# Patient Record
Sex: MALE | Race: WHITE | NOT HISPANIC OR LATINO | Employment: OTHER | URBAN - METROPOLITAN AREA
[De-identification: names, ages, dates, MRNs, and addresses within clinical notes are randomized per-mention and may not be internally consistent; named-entity substitution may affect disease eponyms.]

---

## 2018-01-04 ENCOUNTER — HISTORICAL (OUTPATIENT)
Dept: ADMINISTRATIVE | Facility: HOSPITAL | Age: 68
End: 2018-01-04

## 2021-08-19 ENCOUNTER — OFFICE VISIT (OUTPATIENT)
Dept: UROLOGY | Facility: CLINIC | Age: 71
End: 2021-08-19
Payer: MEDICARE

## 2021-08-19 VITALS
DIASTOLIC BLOOD PRESSURE: 60 MMHG | BODY MASS INDEX: 39.37 KG/M2 | WEIGHT: 245 LBS | RESPIRATION RATE: 18 BRPM | HEIGHT: 66 IN | HEART RATE: 71 BPM | SYSTOLIC BLOOD PRESSURE: 124 MMHG

## 2021-08-19 DIAGNOSIS — N47.1 PHIMOSIS OF PENIS: Primary | ICD-10-CM

## 2021-08-19 DIAGNOSIS — N40.1 BPH WITH OBSTRUCTION/LOWER URINARY TRACT SYMPTOMS: ICD-10-CM

## 2021-08-19 DIAGNOSIS — N13.8 BPH WITH OBSTRUCTION/LOWER URINARY TRACT SYMPTOMS: ICD-10-CM

## 2021-08-19 LAB
ANION GAP SERPL CALC-SCNC: 7 MMOL/L (ref 3–11)
APPEARANCE, UA: CLEAR
BASOPHILS NFR BLD: 0.4 % (ref 0–3)
BILIRUB UR QL STRIP: NEGATIVE MG/DL
BUN SERPL-MCNC: 16 MG/DL (ref 7–18)
BUN/CREAT SERPL: 17.97 RATIO (ref 7–18)
CALCIUM SERPL-MCNC: 9.1 MG/DL (ref 8.8–10.5)
CHLORIDE SERPL-SCNC: 104 MMOL/L (ref 100–108)
CO2 SERPL-SCNC: 29 MMOL/L (ref 21–32)
COLOR UR: ABNORMAL
CREAT SERPL-MCNC: 0.89 MG/DL (ref 0.7–1.3)
EOSINOPHIL NFR BLD: 1 % (ref 1–3)
ERYTHROCYTE [DISTWIDTH] IN BLOOD BY AUTOMATED COUNT: 14.6 % (ref 12.5–18)
GFR ESTIMATION: > 60
GLUCOSE (UA): 1000 MG/DL
GLUCOSE SERPL-MCNC: 135 MG/DL (ref 70–110)
HCT VFR BLD AUTO: 47.1 % (ref 42–52)
HGB BLD-MCNC: 15.7 G/DL (ref 14–18)
HGB UR QL STRIP: NEGATIVE /UL
KETONES UR QL STRIP: NEGATIVE MG/DL
LEUKOCYTE ESTERASE UR QL STRIP: NEGATIVE /UL
LYMPHOCYTES NFR BLD: 28.7 % (ref 25–40)
MCH RBC QN AUTO: 31 PG (ref 27–31.2)
MCHC RBC AUTO-ENTMCNC: 33.3 G/DL (ref 31.8–35.4)
MCV RBC AUTO: 93.1 FL (ref 80–97)
MONOCYTES NFR BLD: 9.4 % (ref 1–15)
NEUTROPHILS # BLD AUTO: 3.98 10*3/UL (ref 1.8–7.7)
NEUTROPHILS NFR BLD: 59.3 % (ref 37–80)
NITRITE UR QL STRIP: NEGATIVE
NUCLEATED RED BLOOD CELLS: 0 %
PH UR STRIP: 5 PH (ref 5–9)
PLATELETS: 190 10*3/UL (ref 142–424)
POTASSIUM SERPL-SCNC: 4.7 MMOL/L (ref 3.6–5.2)
PROT UR QL STRIP: NEGATIVE MG/DL
RBC # BLD AUTO: 5.06 10*6/UL (ref 4.7–6.1)
SODIUM BLD-SCNC: 140 MMOL/L (ref 135–145)
SP GR UR STRIP: 1.01 (ref 1–1.03)
SPECIMEN COLLECTION METHOD, URINE: ABNORMAL
UROBILINOGEN UR STRIP-ACNC: NORMAL MG/DL
WBC # BLD: 6.7 10*3/UL (ref 4.6–10.2)

## 2021-08-19 PROCEDURE — 99203 PR OFFICE/OUTPT VISIT, NEW, LEVL III, 30-44 MIN: ICD-10-PCS | Mod: S$GLB,,, | Performed by: UROLOGY

## 2021-08-19 PROCEDURE — 99203 OFFICE O/P NEW LOW 30 MIN: CPT | Mod: S$GLB,,, | Performed by: UROLOGY

## 2021-08-19 RX ORDER — AMLODIPINE BESYLATE 5 MG/1
5 TABLET ORAL DAILY
COMMUNITY
Start: 2021-07-15

## 2021-08-19 RX ORDER — LISINOPRIL 20 MG/1
20 TABLET ORAL 2 TIMES DAILY
COMMUNITY
Start: 2021-06-01

## 2021-08-19 RX ORDER — TAMSULOSIN HYDROCHLORIDE 0.4 MG/1
1 CAPSULE ORAL DAILY
COMMUNITY
Start: 2021-05-18

## 2021-08-19 RX ORDER — ALLOPURINOL 100 MG/1
100 TABLET ORAL DAILY
COMMUNITY
Start: 2021-06-29

## 2021-08-19 RX ORDER — EMPAGLIFLOZIN 25 MG/1
25 TABLET, FILM COATED ORAL EVERY MORNING
COMMUNITY
Start: 2021-08-09

## 2021-08-19 RX ORDER — MELOXICAM 15 MG/1
15 TABLET ORAL DAILY
COMMUNITY
Start: 2021-07-23

## 2021-08-19 RX ORDER — FINASTERIDE 5 MG/1
5 TABLET, FILM COATED ORAL DAILY
COMMUNITY
Start: 2021-06-22

## 2021-08-19 RX ORDER — SIMVASTATIN 40 MG/1
40 TABLET, FILM COATED ORAL NIGHTLY
COMMUNITY
Start: 2021-07-16

## 2021-08-19 RX ORDER — LATANOPROST 50 UG/ML
SOLUTION/ DROPS OPHTHALMIC
COMMUNITY
Start: 2021-07-20

## 2021-08-23 LAB
B PARAP IS1001 DNA: NOT DETECTED
B PERT.PT PROM REG: NOT DETECTED
C PNEUM DNA: NOT DETECTED
CALCIUM BLD-MCNC: POSITIVE MG/DL
COVID-19 AB, IGM: POSITIVE
FLUAV RNA: NOT DETECTED
FLUBV RNA: NOT DETECTED
HADV DNA: NOT DETECTED
HCOV 229E RNA: NOT DETECTED
HCOV HKU1 RNA: NOT DETECTED
HCOV NL63 RNA: NOT DETECTED
HCOV OC43 RNA: NOT DETECTED
HMPV RNA: NOT DETECTED
HPIV1 RNA: NOT DETECTED
HPIV2 RNA: NOT DETECTED
HPIV3 RNA: NOT DETECTED
HPIV4 RNA: NOT DETECTED
M PNEUMO DNA: NOT DETECTED
RSV RNA: NOT DETECTED
RV+EV RNA: NOT DETECTED
SARS-COV-2 RNA RESP QL NAA+PROBE: NOT DETECTED

## 2021-08-27 ENCOUNTER — OUTSIDE PLACE OF SERVICE (OUTPATIENT)
Dept: UROLOGY | Facility: CLINIC | Age: 71
End: 2021-08-27
Payer: MEDICARE

## 2021-08-27 LAB
GLUCOSE SERPL-MCNC: 163 MG/DL (ref 70–105)
GLUCOSE SERPL-MCNC: 165 MG/DL (ref 70–105)

## 2021-08-27 PROCEDURE — 54161 PR CIRCUMCISION - SURGICAL NO CLAMP/DEVICE, 29+ DAYS OF AGE ONLY: ICD-10-PCS | Mod: ,,, | Performed by: UROLOGY

## 2021-08-27 PROCEDURE — 54161 CIRCUM 28 DAYS OR OLDER: CPT | Mod: ,,, | Performed by: UROLOGY

## 2021-08-29 LAB — SPECIMEN TO PATHOLOGY: NORMAL

## 2021-09-16 ENCOUNTER — OFFICE VISIT (OUTPATIENT)
Dept: UROLOGY | Facility: CLINIC | Age: 71
End: 2021-09-16
Payer: MEDICARE

## 2021-09-16 DIAGNOSIS — N13.8 BPH WITH OBSTRUCTION/LOWER URINARY TRACT SYMPTOMS: ICD-10-CM

## 2021-09-16 DIAGNOSIS — N47.1 PHIMOSIS OF PENIS: Primary | ICD-10-CM

## 2021-09-16 DIAGNOSIS — N40.1 BPH WITH OBSTRUCTION/LOWER URINARY TRACT SYMPTOMS: ICD-10-CM

## 2021-09-16 PROCEDURE — 51798 PR MEAS,POST-VOID RES,US,NON-IMAGING: ICD-10-PCS | Mod: S$GLB,,, | Performed by: NURSE PRACTITIONER

## 2021-09-16 PROCEDURE — 51798 US URINE CAPACITY MEASURE: CPT | Mod: S$GLB,,, | Performed by: NURSE PRACTITIONER

## 2021-09-16 PROCEDURE — 99024 PR POST-OP FOLLOW-UP VISIT: ICD-10-PCS | Mod: S$GLB,POP,, | Performed by: UROLOGY

## 2021-09-16 PROCEDURE — 99024 POSTOP FOLLOW-UP VISIT: CPT | Mod: S$GLB,POP,, | Performed by: UROLOGY

## 2022-04-30 NOTE — OP NOTE
DATE OF SURGERY:        SURGEON:  Sourav Galvez MD    PREOPERATIVE DIAGNOSIS:  Non-occurring vitreous hemorrhage of the left eye.    POSTOPERATIVE DIAGNOSIS:  Non-occurring vitreous hemorrhage of the left eye.    PROCEDURE:  Pars plana vitrectomy with examination under anesthesia of the left eye.    ANESTHESIA:  General.    ESTIMATED BLOOD LOSS:  Less than 5 cc.    COMPLICATIONS:  None.    PROCEDURE INDICATIONS:  Mr. Wallace has a previous history of trauma and has had multiple surgeries to the left eye and has a chronic non-occurring vitreous hemorrhage and a possible underlying retinal detachment.  He requires a vitrectomy with removal of the vitreous hemorrhage and examination under anesthesia.    PROCEDURE IN DETAIL:  Patient was taken to the operating theater where general anesthesia was was begun. The left eye was prepped and draped in a normal sterile fashion and a lid speculum. A standard 3-port 25-gauge pars plana vitrectomy was performed with all trocars being placed 3.5 mm from the surgical limbus.  A core vitrectomy was performed removing the dense white vitreous hemorrhage including the anterior and  posterior hyaloids out to the peripheral retina. The peripheral retina was examined under scleral depression and no retinal breaks were identified.  The optic nerve did appear slightly pale, but otherwise the retina appeared normal.  There was no residual hyphema. All instruments were removed from the eye and all sclerotomies were massaged closed with cotton tip swabs. Several drops of TobraDex ophthalmic solution were applied to the eye. The postoperative intraocular pressure was 15 mmHg.   The lid speculum and eye drape were then removed and the eye was covered with a gauze patch and a rosa shield.  The patient was then transported to the postoperative care unit to recover.    DISCHARGE CONDITION:  Good.      DISPOSITION:  Home with follow up with Dr. Galvez the following day.  This patient  tolerated the procedure well.        ______________________________  MD ANIBAL Bennett  DD:  01/04/2018  Time:  01:27PM  DT:  01/05/2018  Time:  02:42PM  Job #:  [id number]

## 2023-03-30 ENCOUNTER — OFFICE VISIT (OUTPATIENT)
Dept: CARDIOTHORACIC SURGERY | Facility: CLINIC | Age: 73
End: 2023-03-30
Payer: MEDICARE

## 2023-03-30 VITALS
HEIGHT: 66 IN | WEIGHT: 251 LBS | OXYGEN SATURATION: 96 % | SYSTOLIC BLOOD PRESSURE: 160 MMHG | TEMPERATURE: 99 F | HEART RATE: 76 BPM | DIASTOLIC BLOOD PRESSURE: 72 MMHG | BODY MASS INDEX: 40.34 KG/M2

## 2023-03-30 DIAGNOSIS — E11.59 TYPE 2 DIABETES MELLITUS WITH OTHER CIRCULATORY COMPLICATION, WITHOUT LONG-TERM CURRENT USE OF INSULIN: ICD-10-CM

## 2023-03-30 DIAGNOSIS — I10 HYPERTENSION, UNSPECIFIED TYPE: ICD-10-CM

## 2023-03-30 DIAGNOSIS — E78.5 HYPERLIPIDEMIA, UNSPECIFIED HYPERLIPIDEMIA TYPE: ICD-10-CM

## 2023-03-30 DIAGNOSIS — I65.21 STENOSIS OF RIGHT CAROTID ARTERY: Primary | ICD-10-CM

## 2023-03-30 PROBLEM — E11.9 DIABETES: Status: ACTIVE | Noted: 2023-03-30

## 2023-03-30 PROCEDURE — 99204 PR OFFICE/OUTPT VISIT, NEW, LEVL IV, 45-59 MIN: ICD-10-PCS | Mod: S$GLB,,, | Performed by: SURGERY

## 2023-03-30 PROCEDURE — 99204 OFFICE O/P NEW MOD 45 MIN: CPT | Mod: S$GLB,,, | Performed by: SURGERY

## 2023-03-30 NOTE — PROGRESS NOTES
[facility] - Vascular Surgery  History and Physical      Subjective:     Chief Complaint/Reason for Clinic Visit: Evaluation for high-grade right carotid stenosis    History of Present Illness:  72-year-old gentleman with history of type 2 diabetes, hypertension, hyperlipidemia, and former tobacco use presents to clinic for evaluation for high-grade right carotid stenosis.  Patient was referred by Dr. Westfall and ophthalmologist.  Patient reports that for the past year he has had intermittent episodes where he notices gray spots in fog in his right eye.  The patient notes that this happens for 1 to 2 minutes.  Patient denies any history of stroke or transient ischemic attacks.  Patient denies any episodes where he had acute weakness in his arm or leg on either side.  Patient is right-handed.  Patient does have left vision loss.    Current Outpatient Medications on File Prior to Visit   Medication Sig Dispense Refill    allopurinoL (ZYLOPRIM) 100 MG tablet Take 100 mg by mouth once daily.      amLODIPine (NORVASC) 5 MG tablet Take 5 mg by mouth once daily.      finasteride (PROSCAR) 5 mg tablet Take 5 mg by mouth once daily.      JARDIANCE 25 mg tablet Take 25 mg by mouth every morning.      latanoprost 0.005 % ophthalmic solution instill ONE drop into each eye IN THE EVENING daily      lisinopriL (PRINIVIL,ZESTRIL) 20 MG tablet Take 20 mg by mouth 2 (two) times daily.      meloxicam (MOBIC) 15 MG tablet Take 15 mg by mouth once daily.      simvastatin (ZOCOR) 40 MG tablet Take 40 mg by mouth nightly.      tamsulosin (FLOMAX) 0.4 mg Cap Take 1 capsule by mouth once daily.       No current facility-administered medications on file prior to visit.       Review of patient's allergies indicates:  No Known Allergies  Past Medical History:   Diagnosis Date    Diabetes mellitus     Diverticulitis     Hyperlipidemia     Hypertension        Past Surgical History:   Procedure Laterality Date    SKIN GRAFT      TOTAL  "REPLACEMENT OF BOTH KNEES USING COMPUTER-ASSISTED NAVIGATION         Family history notable for type 2 diabetes.    Social History     Socioeconomic History    Marital status: Single   Tobacco Use    Smoking status: Never    Smokeless tobacco: Never   Substance and Sexual Activity    Alcohol use: Not Currently   Former tobacco use.  Quit in 1980.  Prior to that used to smoke 3 packs/day for 20 years.  Works with drilling water wells.    Review of Systems   Constitutional:  Negative for chills and fever.   Eyes:  Positive for blurred vision.   Cardiovascular:  Negative for chest pain and palpitations.   Gastrointestinal:  Negative for abdominal pain, constipation, diarrhea, nausea and vomiting.     Objective:   BP (!) 160/72 (BP Location: Right arm, Patient Position: Sitting, BP Method: Medium (Automatic))   Pulse 76   Temp 98.7 °F (37.1 °C)   Ht 5' 6" (1.676 m)   Wt 113.9 kg (251 lb)   SpO2 96%   BMI 40.51 kg/m²     Physical Exam  Constitutional:       General: He is not in acute distress.     Appearance: Normal appearance. He is normal weight.   HENT:      Head: Normocephalic and atraumatic.   Neck:      Comments: Patient is relatively obese.  Patient does have appropriate extension of the neck.  Cardiovascular:      Rate and Rhythm: Normal rate.   Pulmonary:      Effort: Pulmonary effort is normal. No respiratory distress.      Breath sounds: Normal breath sounds.   Abdominal:      General: Abdomen is flat. Bowel sounds are normal. There is no distension.      Palpations: Abdomen is soft.   Neurological:      General: No focal deficit present.      Mental Status: He is alert and oriented to person, place, and time. Mental status is at baseline.      Cranial Nerves: No cranial nerve deficit.   Psychiatric:         Mood and Affect: Mood normal.         Behavior: Behavior normal.         Thought Content: Thought content normal.     Reviewed outside hospital carotid duplex.  This showed that there was abnormally " low velocities in the right carotid artery.  The velocities were noted to be very low.  There was concern for high-grade stenosis versus occlusion of the distal right ICA recommending CTA for further evaluation.  There is noted to be antegrade flow of the bilateral vertebral arteries.      Reviewed CTA which showed very high-grade stenosis of the distal right common carotid and proximal right internal carotid artery approximately 90 to 95%.  There is noted to be moderate stenosis of the distal left common carotid artery and proximal left internal carotid artery.  Distal to the carotid artery, the internal carotid arteries noted to be decreased in caliber.  The carotid artery seems to be free of disease at the base of C2.  Assessment/Plan:   72-year-old gentleman with history of type 2 diabetes, hypertension, hyperlipidemia, and former tobacco use presents to clinic for evaluation for high-grade right carotid stenosis.      Turn for possible high-grade symptomatic right carotid stenosis manifested by amaurosis fugax.  Patient otherwise without any history is of transient ischemic attacks or strokes.  Reviewed CTA, moderately high lesion with large neck. He does have good mobility of the neck. Small caliber distal right internal carotid artery.  Surgically accessible however.    Patient's daughter is a nurse at [employer].  Patient's daughter does prefer that patient receives care at [facility] by Dr. Perez.    - We will discuss patient's plan with his ophthalmologist to determine if patient has any evidence of any Hollenhorst plaques that would be consistent with amaurosis fugax  - Given the degree of stenosis in the right internal carotid artery, patient would still be benefit from right carotid endarterectomy  - Patient would like to discuss with his family and return to clinic in 2 weeks to determine a plan  - Continue aspirin and statin  - Answered patient's and his wife's questions to their satisfaction.  Patient and  his wife expressed understanding of the plan.    Deon Mera MD  Vascular Surgery

## 2023-05-24 DIAGNOSIS — Z95.1 S/P CABG X 2: Primary | ICD-10-CM
